# Patient Record
Sex: FEMALE | Race: WHITE | NOT HISPANIC OR LATINO | Employment: FULL TIME | URBAN - METROPOLITAN AREA
[De-identification: names, ages, dates, MRNs, and addresses within clinical notes are randomized per-mention and may not be internally consistent; named-entity substitution may affect disease eponyms.]

---

## 2018-01-03 ENCOUNTER — HOSPITAL ENCOUNTER (EMERGENCY)
Facility: HOSPITAL | Age: 28
Discharge: HOME | End: 2018-01-03
Attending: SPECIALIST
Payer: COMMERCIAL

## 2018-01-03 VITALS
TEMPERATURE: 100 F | WEIGHT: 167 LBS | OXYGEN SATURATION: 99 % | DIASTOLIC BLOOD PRESSURE: 86 MMHG | SYSTOLIC BLOOD PRESSURE: 133 MMHG | HEART RATE: 80 BPM | RESPIRATION RATE: 18 BRPM

## 2018-01-03 DIAGNOSIS — S90.01XA CONTUSION OF RIGHT ANKLE, INITIAL ENCOUNTER: ICD-10-CM

## 2018-01-03 DIAGNOSIS — S91.011A LACERATION OF RIGHT ANKLE, INITIAL ENCOUNTER: Primary | ICD-10-CM

## 2018-01-03 DIAGNOSIS — M25.579 ANKLE PAIN: ICD-10-CM

## 2018-01-03 PROCEDURE — 99283 EMERGENCY DEPT VISIT LOW MDM: CPT

## 2018-01-03 PROCEDURE — 25000003 PHARM REV CODE 250: Performed by: NURSE PRACTITIONER

## 2018-01-03 RX ORDER — HYDROCODONE BITARTRATE AND ACETAMINOPHEN 10; 325 MG/1; MG/1
1 TABLET ORAL
Status: COMPLETED | OUTPATIENT
Start: 2018-01-03 | End: 2018-01-03

## 2018-01-03 RX ORDER — TRAMADOL HYDROCHLORIDE 50 MG/1
50 TABLET ORAL EVERY 6 HOURS PRN
Qty: 14 TABLET | Refills: 0 | Status: SHIPPED | OUTPATIENT
Start: 2018-01-03 | End: 2022-05-27

## 2018-01-03 RX ORDER — LIDOCAINE HYDROCHLORIDE 10 MG/ML
10 INJECTION INFILTRATION; PERINEURAL
Status: DISCONTINUED | OUTPATIENT
Start: 2018-01-03 | End: 2018-01-03

## 2018-01-03 RX ADMIN — HYDROCODONE BITARTRATE AND ACETAMINOPHEN 1 TABLET: 10; 325 TABLET ORAL at 01:01

## 2018-01-03 RX ADMIN — BACITRACIN, NEOMYCIN, POLYMYXIN B 1 EACH: 400; 3.5; 5 OINTMENT TOPICAL at 01:01

## 2018-01-03 NOTE — ED PROVIDER NOTES
Encounter Date: 1/3/2018       History     Chief Complaint   Patient presents with    Laceration     right ankle laceration      27 year old female with complaint of laceration and pain to right ankle X 2 hours.  Pt reports that a metal picture fell 9 feet off shelf and struck ankle at home.  Moderate pain.  Worse with walking.  Constant aching pain.  Bleeding controlled.  Reports last tetanus shot was 7 years ago.  No other complaints.           Review of patient's allergies indicates:  No Known Allergies  History reviewed. No pertinent past medical history.  History reviewed. No pertinent surgical history.  History reviewed. No pertinent family history.  Social History   Substance Use Topics    Smoking status: Never Smoker    Smokeless tobacco: Never Used    Alcohol use Yes     Review of Systems   Constitutional: Negative for fever.   HENT: Negative for sore throat.    Respiratory: Negative for shortness of breath.    Cardiovascular: Negative for chest pain.   Gastrointestinal: Negative for nausea.   Genitourinary: Negative for dysuria.   Musculoskeletal: Negative for back pain.   Skin: Negative for rash.        Laceration right ankle   Neurological: Negative for weakness.   Hematological: Does not bruise/bleed easily.       Physical Exam     Initial Vitals [01/03/18 1237]   BP Pulse Resp Temp SpO2   133/86 80 18 99.6 °F (37.6 °C) 99 %      MAP       101.67         Physical Exam    Nursing note and vitals reviewed.  Constitutional: She appears well-developed and well-nourished.   HENT:   Head: Normocephalic and atraumatic.   Eyes: Conjunctivae and EOM are normal. Pupils are equal, round, and reactive to light.   Neck: Normal range of motion. Neck supple.   Cardiovascular: Normal rate, regular rhythm, normal heart sounds and intact distal pulses.   Pulmonary/Chest: Breath sounds normal.   Abdominal: Soft. There is no tenderness. There is no rebound and no guarding.   Musculoskeletal: Normal range of motion.    Tenderness right medial ankle   Neurological: She is alert and oriented to person, place, and time. She has normal strength and normal reflexes.   Skin: Skin is warm and dry.   1 cm well approximated laceration right medial ankle region, no active bleeding   Psychiatric: She has a normal mood and affect. Her behavior is normal. Thought content normal.         ED Course   Splint Application  Date/Time: 1/3/2018 1:50 PM  Performed by: GURVINDER RANGEL  Authorized by: VINOD STEWART   Comments: ACE bandage applied to right ankle: alignment good, neurovascular status intact        Labs Reviewed - No data to display          Medical Decision Makin:51 PM  Pt refusing laceration repair, wound well approximated with no active bleeding                   ED Course      Clinical Impression:   The primary encounter diagnosis was Laceration of right ankle, initial encounter. Diagnoses of Ankle pain and Contusion of right ankle, initial encounter were also pertinent to this visit.                           Gurvinder Rangel NP  [phone]       Gurvinder Rangel NP  [phone]

## 2018-04-17 PROBLEM — F90.0 ATTENTION DEFICIT HYPERACTIVITY DISORDER (ADHD), PREDOMINANTLY INATTENTIVE TYPE: Status: ACTIVE | Noted: 2018-04-17
